# Patient Record
(demographics unavailable — no encounter records)

---

## 2024-10-10 NOTE — HISTORY OF PRESENT ILLNESS
[Never] : never [Current] : current [TextBox_4] : 64-year-old male scheduled for yearly physical.  He is exercising regularly mostly by biking.  He has some spasms of his hands and feet intermittently.  He has tinnitus and has seen ENT.  He has chronic knee and back problems and has had his knee injected and is also seeing a chronic pain specialist.  He had inguinal hernia repair. [TextBox_27] : occasional and gummies to sleep

## 2024-10-10 NOTE — DISCUSSION/SUMMARY
[FreeTextEntry1] : Mr. Felix has multiple musculoskeletal issues and chronic pain including back pain and knee pain.  He will follow-up with chronic pain specialist and have his knee injected.  He has mild elevation of his A1c.  He will follow-up with neurologist for muscle spasms. Advised colonoscopy.  Advised vaccines.  Diet counseling  Exercise and weights  Lose weight.  Low-sodium and low carbohydrate diet.  Reviewed all lab work.  Reviewed EKG.   Time spent reviewing file, taking history , and examining patient: ______46____ minutes

## 2024-10-10 NOTE — REVIEW OF SYSTEMS
[Constipation] : constipation [Arthralgias] : arthralgias [Myalgias] : myalgias [Back Pain] : back pain [Trauma/ Injury] : trauma/ injury [Chronic Pain] : chronic pain [Negative] : Endocrine [TextBox_14] : tinnitus [TextBox_91] : Fx of ankle skiing last yr.

## 2024-10-10 NOTE — PHYSICAL EXAM
[No Acute Distress] : no acute distress [Well Nourished] : well nourished [Well Groomed] : well groomed [No Deformities] : no deformities [Well Developed] : well developed [Normal Oropharynx] : normal oropharynx [Normal Appearance] : normal appearance [Supple] : supple [Thyroid Not Enlarged] : thyroid not enlarged [No Neck Mass] : no neck mass [No Thyroid Nodules] : no thyroid nodules [Normal Rate/Rhythm] : normal rate/rhythm [Normal Pulses] : normal pulses [Normal S1, S2] : normal s1, s2 [No Varicosities] : no varicosities [No Murmurs] : no murmurs [No Resp Distress] : no resp distress [No Acc Muscle Use] : no acc muscle use [Clear to Auscultation Bilaterally] : clear to auscultation bilaterally [Normal to Percussion] : normal to percussion [No Abnormalities] : no abnormalities [Benign] : benign [Not Tender] : not tender [No Masses] : no masses [Soft] : soft [Normal Bowel Sounds] : normal bowel sounds [Normal Gait] : normal gait [No Clubbing] : no clubbing [No Cyanosis] : no cyanosis [No Edema] : no edema [FROM] : FROM [Normal Color/ Pigmentation] : normal color/ pigmentation [No Skin Lesions] : no skin lesions [No Focal Deficits] : no focal deficits [Cranial Nerves Intact] : cranial nerves intact [Normal Reflexes] : normal reflexes [Oriented x3] : oriented x3 [Normal Affect] : normal affect

## 2025-04-03 NOTE — HISTORY OF PRESENT ILLNESS
[FreeTextEntry1] : 66 yo male PMH:  non critical CAD LAD 55%, HLD, fractured left ankle, green light surgery for BPH here today in routine cardaic follow up  Claims to be feeling well no cardiac complaints

## 2025-04-03 NOTE — CARDIOLOGY SUMMARY
[de-identified] : 4/3/25 Sinus Bradycardia [de-identified] : Stress Echo 5/2/2019  NL LV FX, Minimal MR,  [de-identified] : 5/20/2019: Mid LAD 55%, LPDA 25%, EF 55% NL LV SYS FX

## 2025-04-03 NOTE — DISCUSSION/SUMMARY
[EKG obtained to assist in diagnosis and management of assessed problem(s)] : EKG obtained to assist in diagnosis and management of assessed problem(s) [FreeTextEntry1] : CAD: Moderate single vessel LAD disease with negative IFR/FFR, NL LV FX --Strong FH early CAD recommend CTA coronary arteries ( last stress test 2019) denies contrast allergy/will obtain labs  CW ASA/Statin  HLD: CW statin lipids 9/27/24 LDL 85 Medication reviewed states he has been taking Crestor 40 MG, will repeat labs now   Asymptomatic bradycardia NL TSH--remains very active cycling approx 100-125 miles/week   OV after testing  Plan DW patient

## 2025-06-25 NOTE — CARDIOLOGY SUMMARY
[de-identified] : 4/3/25 Sinus Bradycardia [de-identified] : Stress Echo 5/2/2019  NL LV FX, Minimal MR,  [de-identified] : 5/20/2019: Mid LAD 55%, LPDA 25%, EF 55% NL LV SYS FX

## 2025-06-25 NOTE — DISCUSSION/SUMMARY
[EKG obtained to assist in diagnosis and management of assessed problem(s)] : EKG obtained to assist in diagnosis and management of assessed problem(s) [FreeTextEntry1] : CAD: Moderate single vessel LAD disease with negative IFR/FFR, NL LV FX --CT reviewed negative FFR  HLD: CW statin lipids 9/27/24 LDL 85 Medication reviewed states he has been taking Crestor 20 MG, will repeat labs now   Asymptomatic bradycardia NL TSH--remains very active cycling approx 100-125 miles/week   OV in 6 months. Aggressive lipid lowering. Pt will take rosuvastatin 40 mg.  Plan DW patient

## 2025-06-25 NOTE — HISTORY OF PRESENT ILLNESS
[FreeTextEntry1] : 66 yo male PMH:  non critical CAD LAD 55%, HLD, fractured left ankle, green light surgery for BPH here today in routine cardaic follow up CTAcor and FFR reviewed. Mild moderate LAD disease, FFR not ischemic No symptoms  Claims to be feeling well no cardiac complaints